# Patient Record
Sex: MALE
[De-identification: names, ages, dates, MRNs, and addresses within clinical notes are randomized per-mention and may not be internally consistent; named-entity substitution may affect disease eponyms.]

---

## 2017-12-27 ENCOUNTER — HOSPITAL ENCOUNTER (EMERGENCY)
Dept: HOSPITAL 92 - SCSER | Age: 47
Discharge: HOME | End: 2017-12-27
Payer: SELF-PAY

## 2017-12-27 DIAGNOSIS — I10: ICD-10-CM

## 2017-12-27 DIAGNOSIS — K57.92: Primary | ICD-10-CM

## 2017-12-27 DIAGNOSIS — N28.1: ICD-10-CM

## 2017-12-27 LAB
ALP SERPL-CCNC: 113 U/L (ref 40–150)
ALT SERPL W P-5'-P-CCNC: 19 U/L (ref 8–55)
ANION GAP SERPL CALC-SCNC: 14 MMOL/L (ref 10–20)
AST SERPL-CCNC: 19 U/L (ref 5–34)
BASOPHILS # BLD AUTO: 0.1 THOU/UL (ref 0–0.2)
BASOPHILS NFR BLD AUTO: 0.8 % (ref 0–1)
BILIRUB SERPL-MCNC: 1.6 MG/DL (ref 0.2–1.2)
BUN SERPL-MCNC: 10 MG/DL (ref 8.9–20.6)
CALCIUM SERPL-MCNC: 9.3 MG/DL (ref 7.8–10.44)
CHLORIDE SERPL-SCNC: 102 MMOL/L (ref 98–107)
CO2 SERPL-SCNC: 27 MMOL/L (ref 22–29)
CREAT CL PREDICTED SERPL C-G-VRATE: 0 ML/MIN (ref 70–130)
EOSINOPHIL # BLD AUTO: 0.1 THOU/UL (ref 0–0.7)
EOSINOPHIL NFR BLD AUTO: 1 % (ref 0–10)
GLOBULIN SER CALC-MCNC: 3.2 G/DL (ref 2.4–3.5)
HCT VFR BLD CALC: 39.8 % (ref 42–52)
LIPASE SERPL-CCNC: 60 U/L (ref 8–78)
LYMPHOCYTES # BLD: 1.2 THOU/UL (ref 1.2–3.4)
LYMPHOCYTES NFR BLD AUTO: 8.9 % (ref 21–51)
MONOCYTES # BLD AUTO: 0.9 THOU/UL (ref 0.11–0.59)
MONOCYTES NFR BLD AUTO: 6.8 % (ref 0–10)
NEUTROPHILS # BLD AUTO: 10.9 THOU/UL (ref 1.4–6.5)
RBC # BLD AUTO: 4.51 MILL/UL (ref 4.7–6.1)
WBC # BLD AUTO: 13.1 THOU/UL (ref 4.8–10.8)

## 2017-12-27 PROCEDURE — 74177 CT ABD & PELVIS W/CONTRAST: CPT

## 2017-12-27 PROCEDURE — 96360 HYDRATION IV INFUSION INIT: CPT

## 2017-12-27 PROCEDURE — 85025 COMPLETE CBC W/AUTO DIFF WBC: CPT

## 2017-12-27 PROCEDURE — 80053 COMPREHEN METABOLIC PANEL: CPT

## 2017-12-27 PROCEDURE — 83690 ASSAY OF LIPASE: CPT

## 2017-12-27 NOTE — CT
EXAM:

ABDOMEN CT WITH CONTRAST

PELVIC CT WITH CONTRAST

12/27/17

 

HISTORY: 

Abdominal pain. Left lower quadrant pain x1 day.

 

COMPARISON:  

None.

 

TECHNIQUE:  

Abdomen and pelvic CT is performed with IV contrast. Enteric contrast was not administered. Coronal r
eformatted images are submitted for interpretation.

 

FINDINGS:  

 

ABDOMEN CT: 

Lung bases are clear. Heart size is normal. No significant pericardial fluid. Descending thoracic aor
ta and abdominal aorta have a normal caliber. No periaortic fat stranding. 

 

Intra and extrahepatic portal vein is patent. 

 

Contracted gallbladder. 

 

The liver, spleen, pancreas, and adrenal glands have appropriate enhancement. 

 

No gastrohepatic, retrocrural or periportal lymphadenopathy. Hypodensity in the lower pole of the rig
ht kidney and left lower cortex compatible with renal cysts. Bilaterally, no obstructive uropathy. Th
ere is symmetric enhancement of the kidneys.

 

Limited evaluation of the alimentary canal due to lack of oral contrast. Gastric mucosa, duodenum, an
d multiple normal caliber small bowel loops are noted. Ileocecal junction is normal. There is scatter
ed fecal material in a nondistended, nondilated colon. Occasional diverticulum in the descending colo
n. There is mucosal thickening and pericolonic fat stranding involving the mid to distal descending c
olon and proximal sigmoid colon. There is evidence for diverticulitis. There is thickening of the sig
moid mesocolon. No perforation. No abscess. Distal sigmoid colon and the rectum are unremarkable. Nor
mal caliber appendix. 

 

PELVIC CT:

Urinary bladder is unremarkable. No pelvic mass, lymphadenopathy, free air or free fluid. 

 

There are no lytic or blastic lesions in the osseous structures. 

 

IMPRESSION:  

Distal  descending colon and proximal sigmoid colon diverticulitis. No associated abscess or perforat
ion. 

 

POS: [facility]

## 2023-02-21 ENCOUNTER — HOSPITAL ENCOUNTER (OUTPATIENT)
Dept: HOSPITAL 92 - BICRAD | Age: 53
Discharge: HOME | End: 2023-02-21
Attending: STUDENT IN AN ORGANIZED HEALTH CARE EDUCATION/TRAINING PROGRAM
Payer: COMMERCIAL

## 2023-02-21 DIAGNOSIS — M19.012: ICD-10-CM

## 2023-02-21 DIAGNOSIS — M25.512: Primary | ICD-10-CM
